# Patient Record
Sex: MALE | Race: WHITE | URBAN - METROPOLITAN AREA
[De-identification: names, ages, dates, MRNs, and addresses within clinical notes are randomized per-mention and may not be internally consistent; named-entity substitution may affect disease eponyms.]

---

## 2023-02-22 ENCOUNTER — OFFICE VISIT (OUTPATIENT)
Dept: PRIMARY CARE CLINIC | Age: 28
End: 2023-02-22

## 2023-02-22 ENCOUNTER — HOSPITAL ENCOUNTER (OUTPATIENT)
Age: 28
Discharge: HOME | End: 2023-02-22
Payer: COMMERCIAL

## 2023-02-22 VITALS
SYSTOLIC BLOOD PRESSURE: 124 MMHG | DIASTOLIC BLOOD PRESSURE: 79 MMHG | HEIGHT: 71 IN | BODY MASS INDEX: 25.34 KG/M2 | WEIGHT: 181 LBS | HEART RATE: 78 BPM | OXYGEN SATURATION: 97 %

## 2023-02-22 DIAGNOSIS — R07.9 CHEST PAIN, UNSPECIFIED TYPE: ICD-10-CM

## 2023-02-22 DIAGNOSIS — R53.83 OTHER FATIGUE: ICD-10-CM

## 2023-02-22 DIAGNOSIS — F41.9 ANXIETY: ICD-10-CM

## 2023-02-22 DIAGNOSIS — Z76.89 ENCOUNTER TO ESTABLISH CARE: Primary | ICD-10-CM

## 2023-02-22 DIAGNOSIS — R68.82 DECREASED LIBIDO: ICD-10-CM

## 2023-02-22 LAB
25(OH)D3 SERPL-MCNC: 29.1 NG/ML
ABSOLUTE EOS #: 0.43 K/UL (ref 0–0.44)
ABSOLUTE IMMATURE GRANULOCYTE: <0.03 K/UL (ref 0–0.3)
ABSOLUTE LYMPH #: 1.86 K/UL (ref 1.1–3.7)
ABSOLUTE MONO #: 0.66 K/UL (ref 0.1–1.2)
ALBUMIN SERPL-MCNC: 4.7 G/DL (ref 3.5–5.2)
ALBUMIN/GLOBULIN RATIO: 1.9 (ref 1–2.5)
ALP SERPL-CCNC: 47 U/L (ref 40–129)
ALT SERPL-CCNC: 12 U/L (ref 5–41)
ANION GAP SERPL CALCULATED.3IONS-SCNC: 11 MMOL/L (ref 9–17)
AST SERPL-CCNC: 14 U/L
BASOPHILS # BLD: 1 % (ref 0–2)
BASOPHILS ABSOLUTE: 0.07 K/UL (ref 0–0.2)
BILIRUB SERPL-MCNC: 0.3 MG/DL (ref 0.3–1.2)
BUN SERPL-MCNC: 14 MG/DL (ref 6–20)
CALCIUM SERPL-MCNC: 9.7 MG/DL (ref 8.6–10.4)
CHLORIDE SERPL-SCNC: 105 MMOL/L (ref 98–107)
CHOLEST SERPL-MCNC: 122 MG/DL
CHOLESTEROL/HDL RATIO: 3.1
CO2 SERPL-SCNC: 27 MMOL/L (ref 20–31)
CREAT SERPL-MCNC: 0.95 MG/DL (ref 0.7–1.2)
EOSINOPHILS RELATIVE PERCENT: 6 % (ref 1–4)
GFR SERPL CREATININE-BSD FRML MDRD: >60 ML/MIN/1.73M2
GLUCOSE SERPL-MCNC: 82 MG/DL (ref 70–99)
HCT VFR BLD AUTO: 46.4 % (ref 40.7–50.3)
HCV AB SER QL: NONREACTIVE
HDLC SERPL-MCNC: 39 MG/DL
HGB BLD-MCNC: 16.1 G/DL (ref 13–17)
IMMATURE GRANULOCYTES: 0 %
LDLC SERPL CALC-MCNC: 43 MG/DL (ref 0–130)
LYMPHOCYTES # BLD: 26 % (ref 24–43)
MCH RBC QN AUTO: 28.2 PG (ref 25.2–33.5)
MCHC RBC AUTO-ENTMCNC: 34.7 G/DL (ref 28.4–34.8)
MCV RBC AUTO: 81.4 FL (ref 82.6–102.9)
MONOCYTES # BLD: 9 % (ref 3–12)
NRBC AUTOMATED: 0 PER 100 WBC
PDW BLD-RTO: 12.9 % (ref 11.8–14.4)
PLATELET # BLD AUTO: 228 K/UL (ref 138–453)
PMV BLD AUTO: 11.2 FL (ref 8.1–13.5)
POTASSIUM SERPL-SCNC: 4.4 MMOL/L (ref 3.7–5.3)
PROT SERPL-MCNC: 7.2 G/DL (ref 6.4–8.3)
RBC # BLD: 5.7 M/UL (ref 4.21–5.77)
RBC # BLD: ABNORMAL 10*6/UL
SEG NEUTROPHILS: 58 % (ref 36–65)
SEGMENTED NEUTROPHILS ABSOLUTE COUNT: 4.15 K/UL (ref 1.5–8.1)
SHBG SERPL-SCNC: 35 NMOL/L (ref 11–80)
SODIUM SERPL-SCNC: 143 MMOL/L (ref 135–144)
TESTOST FREE MFR SERPL: 133.5 PG/ML (ref 47–244)
TESTOST SERPL-MCNC: 632 NG/DL (ref 220–1000)
TESTOSTERONE, BIOAVAILABLE: 312.7 NG/DL (ref 130–680)
TRIGL SERPL-MCNC: 201 MG/DL
TSH SERPL-ACNC: 1.91 UIU/ML (ref 0.3–5)
WBC # BLD AUTO: 7.2 K/UL (ref 3.5–11.3)

## 2023-02-22 PROCEDURE — 84403 ASSAY OF TOTAL TESTOSTERONE: CPT

## 2023-02-22 PROCEDURE — 85025 COMPLETE CBC W/AUTO DIFF WBC: CPT

## 2023-02-22 PROCEDURE — 84270 ASSAY OF SEX HORMONE GLOBUL: CPT

## 2023-02-22 PROCEDURE — 86803 HEPATITIS C AB TEST: CPT

## 2023-02-22 PROCEDURE — 87389 HIV-1 AG W/HIV-1&-2 AB AG IA: CPT

## 2023-02-22 PROCEDURE — 80061 LIPID PANEL: CPT

## 2023-02-22 PROCEDURE — 84443 ASSAY THYROID STIM HORMONE: CPT

## 2023-02-22 PROCEDURE — 82306 VITAMIN D 25 HYDROXY: CPT

## 2023-02-22 PROCEDURE — 80053 COMPREHEN METABOLIC PANEL: CPT

## 2023-02-22 PROCEDURE — 36415 COLL VENOUS BLD VENIPUNCTURE: CPT

## 2023-02-22 RX ORDER — ESCITALOPRAM OXALATE 10 MG/1
10 TABLET ORAL DAILY
Qty: 30 TABLET | Refills: 3 | Status: SHIPPED | OUTPATIENT
Start: 2023-02-22

## 2023-02-22 ASSESSMENT — ENCOUNTER SYMPTOMS
SORE THROAT: 0
BACK PAIN: 0
NAUSEA: 0
COLOR CHANGE: 0
DIARRHEA: 0
COUGH: 0
CHEST TIGHTNESS: 1
PHOTOPHOBIA: 0
SINUS PRESSURE: 0
ABDOMINAL PAIN: 0
VOMITING: 0
SINUS PAIN: 0
SHORTNESS OF BREATH: 0

## 2023-02-22 ASSESSMENT — PATIENT HEALTH QUESTIONNAIRE - PHQ9
SUM OF ALL RESPONSES TO PHQ9 QUESTIONS 1 & 2: 0
SUM OF ALL RESPONSES TO PHQ QUESTIONS 1-9: 0
SUM OF ALL RESPONSES TO PHQ QUESTIONS 1-9: 0
1. LITTLE INTEREST OR PLEASURE IN DOING THINGS: 0
2. FEELING DOWN, DEPRESSED OR HOPELESS: 0
SUM OF ALL RESPONSES TO PHQ QUESTIONS 1-9: 0
SUM OF ALL RESPONSES TO PHQ QUESTIONS 1-9: 0

## 2023-02-22 ASSESSMENT — ANXIETY QUESTIONNAIRES
1. FEELING NERVOUS, ANXIOUS, OR ON EDGE: 3
6. BECOMING EASILY ANNOYED OR IRRITABLE: 3
GAD7 TOTAL SCORE: 15
3. WORRYING TOO MUCH ABOUT DIFFERENT THINGS: 3
7. FEELING AFRAID AS IF SOMETHING AWFUL MIGHT HAPPEN: 0
4. TROUBLE RELAXING: 3
2. NOT BEING ABLE TO STOP OR CONTROL WORRYING: 3
5. BEING SO RESTLESS THAT IT IS HARD TO SIT STILL: 0

## 2023-02-22 NOTE — PROGRESS NOTES
[address] [facility]  [address] [id number]  Dept: [phone]       Diamante Unger is a 33 y.o. male who presents today for his  medical conditions/complaintsas noted below. Diamante Unger is c/o of New Patient (Pt is here to establish care )      HPI:     HPI    This is a 33year old male with underlying history of ADHD who presents today to establish care. Primary concern is constant fatigue and decreased libido. Patient endorses the ability to obtain an erection and does have morning erections on awakening but states, 'no drive'. He also reports restless sleep and constantly feeling fatigued despite having a full nights sleep. Patient does tell me he did a year of heavy steroid use and over the last 6 months, symptoms have been worsening. He has also noticed weight loss and decreased appetite. He denies any fevers, body aches, chills or recent illness. Patient works the [employer] and works 6x12 hour shifts/week. Patient also endorses daily episodes of chest pain that seem to correlate to high stress situations. Patient has three young boys and carries most of the home burden and often feels tense and overwhelmed when at home. He denies any associated dyspnea, HA/flushing or diaphoresis, vision changes or numbness/tingling. Anxiety screening done in office, see score below:  ADONIS 7 SCORE 2/22/2023   ADONIS-7 Total Score 15     Interpretation of ADONIS-7 score: 5-9 = mild anxiety, 10-14 = moderate anxiety, 15+ = severe anxiety. Recommend referral to behavioral health for scores 10 or greater. Healthcare maintenance measures discussed and labs ordered accordingly.      Past Medical History:   Diagnosis Date    ADHD (attention deficit hyperactivity disorder)       Past Surgical History:   Procedure Laterality Date    WISDOM TOOTH EXTRACTION Right      Family History   Problem Relation Age of Onset    Kidney Disease Father Social History     Tobacco Use    Smoking status: Never    Smokeless tobacco: Never   Substance Use Topics    Alcohol use: No      Current Outpatient Medications   Medication Sig Dispense Refill    escitalopram (LEXAPRO) 10 MG tablet Take 1 tablet by mouth daily 30 tablet 3    permethrin (ELIMITE) 5 % cream Apply topically to entire body or affected areas once, leave on for 8 hours, then rinse. May repeat in 1-2 weeks if symptoms persist. (Patient not taking: Reported on 2/22/2023) 1 Tube 1     No current facility-administered medications for this visit. No Known Allergies    Health Maintenance   Topic Date Due    COVID-19 Vaccine (1) Never done    Varicella vaccine (1 of 2 - 2-dose childhood series) Never done    HPV vaccine (1 - Male 2-dose series) Never done    HIV screen  Never done    Hepatitis C screen  Never done    DTaP/Tdap/Td vaccine (1 - Tdap) Never done    Flu vaccine (1) Never done    Depression Screen  02/22/2024    Hepatitis A vaccine  Aged Out    Hib vaccine  Aged Out    Meningococcal (ACWY) vaccine  Aged Out    Pneumococcal 0-64 years Vaccine  Aged Out       Review of Systems   Constitutional:  Positive for appetite change and fatigue. Negative for activity change and fever. HENT:  Negative for sinus pressure, sinus pain and sore throat. Eyes:  Negative for photophobia and visual disturbance. Respiratory:  Positive for chest tightness. Negative for cough and shortness of breath. Cardiovascular:  Positive for chest pain. Negative for palpitations and leg swelling. Gastrointestinal:  Negative for abdominal pain, diarrhea, nausea and vomiting. Endocrine: Negative for polydipsia and polyuria. Genitourinary:  Negative for difficulty urinating. Musculoskeletal:  Negative for back pain and neck pain. Skin:  Negative for color change. Neurological:  Negative for dizziness, light-headedness and headaches. Psychiatric/Behavioral:  Positive for sleep disturbance.  Negative for behavioral problems. The patient is nervous/anxious. Objective:     Physical Exam  Vitals and nursing note reviewed. Constitutional:       General: He is not in acute distress. Appearance: Normal appearance. He is not ill-appearing. HENT:      Head: Normocephalic and atraumatic. Right Ear: Tympanic membrane, ear canal and external ear normal.      Left Ear: Tympanic membrane, ear canal and external ear normal.      Nose: Nose normal. No congestion or rhinorrhea. Mouth/Throat:      Mouth: Mucous membranes are moist.      Pharynx: Oropharynx is clear. Eyes:      Extraocular Movements: Extraocular movements intact. Conjunctiva/sclera: Conjunctivae normal.      Pupils: Pupils are equal, round, and reactive to light. Cardiovascular:      Rate and Rhythm: Normal rate and regular rhythm. Pulses: Normal pulses. Heart sounds: Normal heart sounds. No murmur heard. Pulmonary:      Effort: Pulmonary effort is normal. No respiratory distress. Breath sounds: Normal breath sounds. No wheezing. Abdominal:      General: Abdomen is flat. Bowel sounds are normal.      Palpations: Abdomen is soft. Tenderness: There is no abdominal tenderness. Musculoskeletal:         General: No swelling or signs of injury. Normal range of motion. Cervical back: Normal range of motion and neck supple. Skin:     General: Skin is warm and dry. Capillary Refill: Capillary refill takes less than 2 seconds. Neurological:      General: No focal deficit present. Mental Status: He is alert and oriented to person, place, and time. Mental status is at baseline. Motor: No weakness. Gait: Gait normal.   Psychiatric:         Mood and Affect: Mood normal.         Behavior: Behavior normal.         Thought Content: Thought content normal.         Judgment: Judgment normal.       Assessment:      No results found for this visit on 02/22/23.     Olaf Nguyen was seen today for new patient. Diagnoses and all orders for this visit:    Encounter to establish care    Decreased libido    Anxiety  -     escitalopram (LEXAPRO) 10 MG tablet; Take 1 tablet by mouth daily    Other fatigue  -     CBC with Auto Differential; Future  -     Testosterone Free & Bio, Total; Future  -     Vitamin D 25 Hydroxy; Future  -     HIV Screen; Future  -     Hepatitis C Antibody; Future    Chest pain, unspecified type  -     EKG 12 Lead  -     TSH With Reflex Ft4; Future  -     Comprehensive Metabolic Panel; Future  -     Lipid Panel; Future        No follow-ups on file. Patient agrees to start Lexapro. Educational materials provided and adrs discussed. Will follow labs and have patient return in 1 month    Plan of care reviewed with patient. Questions and concerns addressed to patient satisfaction. Follow up as directed.      Electronically signed by NENO Gonzalez CNP, PACon 2/22/2023 at 5:09 PM

## 2023-02-23 LAB — HIV 1+2 AB+HIV1 P24 AG SERPL QL IA: NONREACTIVE

## 2023-03-22 ENCOUNTER — OFFICE VISIT (OUTPATIENT)
Dept: PRIMARY CARE CLINIC | Age: 28
End: 2023-03-22
Payer: COMMERCIAL

## 2023-03-22 VITALS
WEIGHT: 177 LBS | HEIGHT: 71 IN | DIASTOLIC BLOOD PRESSURE: 67 MMHG | OXYGEN SATURATION: 97 % | SYSTOLIC BLOOD PRESSURE: 110 MMHG | BODY MASS INDEX: 24.78 KG/M2 | HEART RATE: 66 BPM

## 2023-03-22 DIAGNOSIS — F41.9 ANXIETY: ICD-10-CM

## 2023-03-22 PROCEDURE — 99213 OFFICE O/P EST LOW 20 MIN: CPT | Performed by: NURSE PRACTITIONER

## 2023-03-22 RX ORDER — ESCITALOPRAM OXALATE 10 MG/1
20 TABLET ORAL DAILY
Qty: 30 TABLET | Refills: 3 | Status: SHIPPED | OUTPATIENT
Start: 2023-03-22

## 2023-03-22 ASSESSMENT — ENCOUNTER SYMPTOMS
VOMITING: 0
CHEST TIGHTNESS: 0
NAUSEA: 0
COLOR CHANGE: 0
DIARRHEA: 0
ABDOMINAL PAIN: 0
BACK PAIN: 0
COUGH: 0
SINUS PAIN: 0
SHORTNESS OF BREATH: 0
SINUS PRESSURE: 0
SORE THROAT: 0
PHOTOPHOBIA: 0

## 2023-03-22 NOTE — PROGRESS NOTES
[address] [facility]  [address] [address]  Dept: [phone]       Lyubov Sanchez is a 33 y.o. male who presents today for his  medical conditions/complaintsas noted below. Lyubov Sanchez is c/o of Follow-up (Pt is here for his follow up with no concerns or changes )      HPI:     HPI    Patient returns for 1 month follow-up after starting escitalopram on last visit. Dates he is feeling a significant improvement. Overall, states he is feeling less irritable and significantly less stressed. He also states his wife has noticed a difference. 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 He is requesting to increase medication to 20 mg daily, which is reasonable at this time. He denies any major swings in mood with anxiety or depression. Past Medical History:   Diagnosis Date    ADHD (attention deficit hyperactivity disorder)       Past Surgical History:   Procedure Laterality Date    WISDOM TOOTH EXTRACTION Right      Family History   Problem Relation Age of Onset    Kidney Disease Father      Social History     Tobacco Use    Smoking status: Never    Smokeless tobacco: Never   Substance Use Topics    Alcohol use: No      Current Outpatient Medications   Medication Sig Dispense Refill    escitalopram (LEXAPRO) 10 MG tablet Take 2 tablets by mouth daily 30 tablet 3    permethrin (ELIMITE) 5 % cream Apply topically to entire body or affected areas once, leave on for 8 hours, then rinse. May repeat in 1-2 weeks if symptoms persist. (Patient taking differently: Apply topically to entire body or affected areas once, leave on for 8 hours, then rinse. May repeat in 1-2 weeks if symptoms persist.) 1 Tube 1     No current facility-administered medications for this visit.      No Known Allergies    Health Maintenance   Topic Date Due    COVID-19 Vaccine (1) Never done    Varicella

## 2023-05-10 DIAGNOSIS — F41.9 ANXIETY: ICD-10-CM

## 2023-05-10 RX ORDER — ESCITALOPRAM OXALATE 10 MG/1
20 TABLET ORAL DAILY
Qty: 30 TABLET | Refills: 3 | OUTPATIENT
Start: 2023-05-10

## 2023-05-31 DIAGNOSIS — F41.9 ANXIETY: ICD-10-CM

## 2023-06-01 RX ORDER — ESCITALOPRAM OXALATE 10 MG/1
20 TABLET ORAL DAILY
Qty: 90 TABLET | Refills: 1 | Status: SHIPPED | OUTPATIENT
Start: 2023-06-01 | End: 2023-07-06

## 2023-07-05 ENCOUNTER — OFFICE VISIT (OUTPATIENT)
Dept: PRIMARY CARE CLINIC | Age: 28
End: 2023-07-05
Payer: COMMERCIAL

## 2023-07-05 VITALS
SYSTOLIC BLOOD PRESSURE: 119 MMHG | DIASTOLIC BLOOD PRESSURE: 60 MMHG | HEART RATE: 72 BPM | WEIGHT: 193 LBS | OXYGEN SATURATION: 96 % | BODY MASS INDEX: 27.02 KG/M2 | HEIGHT: 71 IN

## 2023-07-05 DIAGNOSIS — F41.9 ANXIETY: Primary | ICD-10-CM

## 2023-07-05 PROCEDURE — 99213 OFFICE O/P EST LOW 20 MIN: CPT | Performed by: NURSE PRACTITIONER

## 2023-07-05 RX ORDER — BUSPIRONE HYDROCHLORIDE 5 MG/1
5 TABLET ORAL 3 TIMES DAILY
Qty: 90 TABLET | Refills: 0 | Status: SHIPPED | OUTPATIENT
Start: 2023-07-05 | End: 2023-08-04

## 2023-07-05 NOTE — PROGRESS NOTES
Leti Conn (:  1995) is a 33 y.o. male,Established patient, here for evaluation of the following chief complaint(s):  No chief complaint on file. ASSESSMENT/PLAN:  1. Anxiety  -     busPIRone (BUSPAR) 5 MG tablet; Take 1 tablet by mouth 3 times daily, Disp-90 tablet, R-0Normal      Return in about 4 weeks (around 2023). Ok to schedule patient for a virtual visit given his work schedule and drive time-- has difficulty making it to later appointments on . Subjective   SUBJECTIVE/OBJECTIVE:  HPI    Patient returns for routine follow-up. Initially states that he feels as though he needs an increase on his escitalopram dose as he feels as though his symptoms have plateaued. He continues to note high anxiety and irritability particularly when at home after work. We discussed options to increase escitalopram dose to 30 mg daily which would be max dose or adding another agent to target patient's anxiety. With continued discussion, patient admits he stopped taking his escitalopram dose probably 4 weeks ago. Patient states he stopped taking medication as he was no longer \"feeling improvement\". As above, heightened anxiety and irritability when at home which particularly worsens with changes to his planned/scheduled routine/lack of control. We discussed the options to restart escitalopram and increase to 30 mg, changed to buspirone twice daily to 3 times daily pending patient's symptoms or adding buspirone to daily escitalopram.  Given the patient has been off his escitalopram, at this time, he agrees to switch medications and buspirone. Have multiple options moving forward if we need to increase dose of buspirone and or add escitalopram back in. Review of Systems   Constitutional:  Negative for activity change, appetite change and fever. HENT:  Negative for sinus pressure, sinus pain and sore throat. Eyes:  Negative for photophobia and visual disturbance.

## 2023-07-06 ASSESSMENT — ENCOUNTER SYMPTOMS
SINUS PAIN: 0
PHOTOPHOBIA: 0
SORE THROAT: 0
COUGH: 0
COLOR CHANGE: 0
DIARRHEA: 0
SINUS PRESSURE: 0
BACK PAIN: 0
VOMITING: 0
SHORTNESS OF BREATH: 0
NAUSEA: 0
CHEST TIGHTNESS: 0
ABDOMINAL PAIN: 0

## 2023-08-01 DIAGNOSIS — F41.9 ANXIETY: ICD-10-CM

## 2023-08-01 RX ORDER — BUSPIRONE HYDROCHLORIDE 5 MG/1
5 TABLET ORAL 3 TIMES DAILY
Qty: 90 TABLET | Refills: 0 | Status: SHIPPED | OUTPATIENT
Start: 2023-08-01 | End: 2023-08-31

## 2023-08-31 DIAGNOSIS — F41.9 ANXIETY: ICD-10-CM

## 2023-09-01 RX ORDER — BUSPIRONE HYDROCHLORIDE 5 MG/1
5 TABLET ORAL 3 TIMES DAILY
Qty: 90 TABLET | Refills: 0 | Status: SHIPPED | OUTPATIENT
Start: 2023-09-01 | End: 2023-10-01

## 2023-11-10 ENCOUNTER — TELEMEDICINE (OUTPATIENT)
Dept: PRIMARY CARE CLINIC | Age: 28
End: 2023-11-10
Payer: COMMERCIAL

## 2023-11-10 DIAGNOSIS — F42.2 MIXED OBSESSIONAL THOUGHTS AND ACTS: Primary | ICD-10-CM

## 2023-11-10 PROCEDURE — 99213 OFFICE O/P EST LOW 20 MIN: CPT | Performed by: NURSE PRACTITIONER

## 2023-11-10 RX ORDER — BUSPIRONE HYDROCHLORIDE 5 MG/1
5 TABLET ORAL 3 TIMES DAILY
COMMUNITY
End: 2023-11-10

## 2023-11-10 SDOH — ECONOMIC STABILITY: FOOD INSECURITY: WITHIN THE PAST 12 MONTHS, YOU WORRIED THAT YOUR FOOD WOULD RUN OUT BEFORE YOU GOT MONEY TO BUY MORE.: NEVER TRUE

## 2023-11-10 SDOH — ECONOMIC STABILITY: HOUSING INSECURITY
IN THE LAST 12 MONTHS, WAS THERE A TIME WHEN YOU DID NOT HAVE A STEADY PLACE TO SLEEP OR SLEPT IN A SHELTER (INCLUDING NOW)?: NO

## 2023-11-10 SDOH — ECONOMIC STABILITY: TRANSPORTATION INSECURITY
IN THE PAST 12 MONTHS, HAS LACK OF TRANSPORTATION KEPT YOU FROM MEETINGS, WORK, OR FROM GETTING THINGS NEEDED FOR DAILY LIVING?: NO

## 2023-11-10 SDOH — ECONOMIC STABILITY: INCOME INSECURITY: HOW HARD IS IT FOR YOU TO PAY FOR THE VERY BASICS LIKE FOOD, HOUSING, MEDICAL CARE, AND HEATING?: NOT HARD AT ALL

## 2023-11-10 SDOH — ECONOMIC STABILITY: FOOD INSECURITY: WITHIN THE PAST 12 MONTHS, THE FOOD YOU BOUGHT JUST DIDN'T LAST AND YOU DIDN'T HAVE MONEY TO GET MORE.: NEVER TRUE

## 2023-11-10 NOTE — PROGRESS NOTES
Fuentes Galeana, was evaluated through a synchronous (real-time) audio-video encounter. The patient (or guardian if applicable) is aware that this is a billable service, which includes applicable co-pays. This Virtual Visit was conducted with patient's (and/or legal guardian's) consent. Patient identification was verified, and a caregiver was present when appropriate. The patient was located at Other: Car; parked during work break  Provider was located at [facility] ([address] [facility]): [address] [facility] [address] (:  1995) is a Established patient, presenting virtually for evaluation of the following:    Assessment & Plan   Below is the assessment and plan developed based on review of pertinent history, physical exam, labs, studies, and medications. 1. Mixed obsessional thoughts and acts  -     sertraline (ZOLOFT) 50 MG tablet; Take 1 tablet by mouth daily, Disp-90 tablet, R-1Normal    No follow-ups on file. Subjective   HPI    Continues to work long hours. States he is fatigued, but more so due to work schedule. Stopped taking busprione since our last visit. States he did not feel any improvement. Today, is still wanting to trial medication. States his wife is concerned with behaviors resembling OCD. Patient has mentioned this in the past, but feels as though is worsening. Also, admits to feeling overstimulated and drained when returning home after work. Feels house is 'out of order' and he does not feel able to rest until he has 'put everything back together'. These thoughts/symptoms cause him to be on edge with his wife and children. We discussed counseling today, again. Patient is agreeable. Does mention family history of mental health including OCD. Patient agreeable to trial of sertraline. Again, discussed time frame for medication to build to therapeutic levels. Patient verbalized understanding. Will discuss with trisha direction options for counseling.  Patient really would benefit from

## 2023-11-12 ASSESSMENT — ENCOUNTER SYMPTOMS
SHORTNESS OF BREATH: 0
NAUSEA: 0
SORE THROAT: 0
ABDOMINAL PAIN: 0
COUGH: 0
SINUS PRESSURE: 0
DIARRHEA: 0
BACK PAIN: 0
PHOTOPHOBIA: 0
COLOR CHANGE: 0
CHEST TIGHTNESS: 0
VOMITING: 0
SINUS PAIN: 0

## 2023-12-29 ENCOUNTER — TELEMEDICINE (OUTPATIENT)
Dept: PRIMARY CARE CLINIC | Age: 28
End: 2023-12-29
Payer: COMMERCIAL

## 2023-12-29 DIAGNOSIS — Z91.018 FOOD ALLERGY: ICD-10-CM

## 2023-12-29 DIAGNOSIS — F42.2 MIXED OBSESSIONAL THOUGHTS AND ACTS: Primary | ICD-10-CM

## 2023-12-29 PROCEDURE — 99213 OFFICE O/P EST LOW 20 MIN: CPT | Performed by: NURSE PRACTITIONER

## 2023-12-29 NOTE — PROGRESS NOTES
Mathieu Diaz (:  1995) is a 33 y.o. male,Established patient, here for evaluation of the following chief complaint(s):  Follow-up and Medication Check         ASSESSMENT/PLAN:  1. Mixed obsessional thoughts and acts  -     sertraline (ZOLOFT) 50 MG tablet; Take 2 tablets by mouth daily, Disp-90 tablet, R-1Normal  2. Food allergy  -     Common Food Adult Allergen Profile; Future      Return in about 3 months (around 3/29/2024). Subjective   SUBJECTIVE/OBJECTIVE:  HPI    Overall, patient states he is feeling improved with the sertraline. Some episodes of high anxiety/overstimulation with chaos/mess, but overall, has improved. [place] was a trigger for the patient in light of the above. He has had some decreased appetite with starting medication, but has been able to make conscious effort to continue with regular dietary consumption. Is not losing weight. Also concern with new food sensitivities to gluten/bread. Has been noting with any kind of bread/beer sensation throat swelling and irritation. Will add on food allergen panel. Also advised use of cetirizine, famotidine and benadryl as needed. Review of Systems   Constitutional:  Positive for appetite change. Negative for activity change and fever. HENT:  Negative for sinus pressure, sinus pain and sore throat. Eyes:  Negative for photophobia and visual disturbance. Respiratory:  Negative for cough, chest tightness and shortness of breath. Cardiovascular:  Negative for chest pain. Gastrointestinal:  Negative for abdominal pain, diarrhea, nausea and vomiting. Endocrine: Negative for polydipsia and polyuria. Genitourinary:  Negative for difficulty urinating. Musculoskeletal:  Negative for back pain and neck pain. Skin:  Negative for color change. Allergic/Immunologic: Positive for food allergies. Neurological:  Negative for dizziness, light-headedness and headaches.    Psychiatric/Behavioral:  Positive for

## 2024-06-29 DIAGNOSIS — F42.2 MIXED OBSESSIONAL THOUGHTS AND ACTS: ICD-10-CM

## 2024-09-08 DIAGNOSIS — F42.2 MIXED OBSESSIONAL THOUGHTS AND ACTS: ICD-10-CM
